# Patient Record
(demographics unavailable — no encounter records)

---

## 2024-12-07 NOTE — PHYSICAL EXAM
[NL] : warm, clear [Toxic] : not toxic [Tenderness with Palpation] : no tenderness with palpation [McBurney's point tenderness] : no McBurney's point tenderness [Rebound tenderness] : no rebound tenderness [FreeTextEntry9] : mild upper right epigastr

## 2024-12-07 NOTE — HISTORY OF PRESENT ILLNESS
[de-identified] : RUQ pain.  [FreeTextEntry6] : RUQ 2-3x weeks multiple times/day few seconds.  Feels achy.  not associated with food.  no change in activites - pain not stopping her from doing anything.   NO recent cough/URI/fever.  NO chills. NO weight loss  Appetite wnl.   Menses- 10y/o onset.  2weeks. Cramps/heavy bleeding/ first few days/lasts 4-5 days.

## 2024-12-07 NOTE — HISTORY OF PRESENT ILLNESS
[de-identified] : RUQ pain.  [FreeTextEntry6] : RUQ 2-3x weeks multiple times/day few seconds.  Feels achy.  not associated with food.  no change in activites - pain not stopping her from doing anything.   NO recent cough/URI/fever.  NO chills. NO weight loss  Appetite wnl.   Menses- 12y/o onset.  2weeks. Cramps/heavy bleeding/ first few days/lasts 4-5 days.

## 2024-12-07 NOTE — RISK ASSESSMENT
[Has family members/adults to turn to for help] : has family members/adults to turn to for help [Eats regular meals including adequate fruits and vegetables] : eats regular meals including adequate fruits and vegetables

## 2024-12-07 NOTE — DISCUSSION/SUMMARY
[FreeTextEntry1] :  15 y/o female with intermittent RUQ abdominal discomfort and occasional nausea/heart burn s/s. Will trial Pepcid BID.  Ordered RUQ/ABDOMINAL U/S - will follow up.  d/w dad and patient RED FLAGS for acute abdomen - indications for ED eval discussed, signs of distress/dehydration reviewed - parent demonstrates an understanding, is able to repeat back instructions and has no questions at this time.   AAP 5210 reviewed - increase fruits/vegetables, NO sodas/juice- drink water only, <2 hr TV/screen time and at least 1 hour of exercise a day --  once feeling better may resume normal activity & diet.  Return sooner PRN.  Well care as scheduled.

## 2025-06-05 NOTE — DISCUSSION/SUMMARY
[Normal Growth] : growth [Normal Development] : development  [No Elimination Concerns] : elimination [Continue Regimen] : feeding [No Skin Concerns] : skin [Normal Sleep Pattern] : sleep [None] : no medical problems [Anticipatory Guidance Given] : Anticipatory guidance addressed as per the history of present illness section [Physical Growth and Development] : physical growth and development [Social and Academic Competence] : social and academic competence [Emotional Well-Being] : emotional well-being [Risk Reduction] : risk reduction [Violence and Injury Prevention] : violence and injury prevention [MCV] : meningococcal conjugate vaccine [No Medications] : ~He/She~ is not on any medications [Patient] : patient [Parent/Guardian] : Parent/Guardian

## 2025-06-05 NOTE — HISTORY OF PRESENT ILLNESS
[Mother] : mother [Yes] : Patient goes to dentist yearly [Toothpaste] : Primary Fluoride Source: Toothpaste [Up to date] : Up to date [Heavy Bleeding] : heavy bleeding [Has family members/adults to turn to for help] : has family members/adults to turn to for help [Normal Performance] : normal performance [Eats regular meals including adequate fruits and vegetables] : eats regular meals including adequate fruits and vegetables [Has friends] : has friends [Uses safety belts/safety equipment] : uses safety belts/safety equipment  [No] : Patient has not had sexual intercourse. [Has ways to cope with stress] : has ways to cope with stress [Displays self-confidence] : displays self-confidence [With Teen] : teen [Has concerns about body or appearance] : does not have concerns about body or appearance [Uses electronic nicotine delivery system] : does not use electronic nicotine delivery system [Uses tobacco] : does not use tobacco [Uses drugs] : does not use drugs  [Drinks alcohol] : does not drink alcohol [Has problems with sleep] : does not have problems with sleep [Gets depressed, anxious, or irritable/has mood swings] : does not get depressed, anxious, or irritable/has mood swings [Has thought about hurting self or considered suicide] : has not thought about hurting self or considered suicide

## 2025-06-05 NOTE — PHYSICAL EXAM
